# Patient Record
Sex: FEMALE | Race: WHITE | Employment: FULL TIME | URBAN - NONMETROPOLITAN AREA
[De-identification: names, ages, dates, MRNs, and addresses within clinical notes are randomized per-mention and may not be internally consistent; named-entity substitution may affect disease eponyms.]

---

## 2017-02-02 ENCOUNTER — OFFICE VISIT (OUTPATIENT)
Dept: CARDIOLOGY | Age: 47
End: 2017-02-02

## 2017-02-02 VITALS
DIASTOLIC BLOOD PRESSURE: 76 MMHG | HEART RATE: 80 BPM | WEIGHT: 230 LBS | BODY MASS INDEX: 36.1 KG/M2 | SYSTOLIC BLOOD PRESSURE: 142 MMHG | HEIGHT: 67 IN

## 2017-02-02 DIAGNOSIS — R07.9 CHEST PAIN, UNSPECIFIED: Primary | ICD-10-CM

## 2017-02-02 PROCEDURE — 99213 OFFICE O/P EST LOW 20 MIN: CPT | Performed by: INTERNAL MEDICINE

## 2017-02-02 RX ORDER — OXYCODONE AND ACETAMINOPHEN 7.5; 325 MG/1; MG/1
1 TABLET ORAL 2 TIMES DAILY
COMMUNITY
End: 2017-08-03 | Stop reason: ALTCHOICE

## 2017-02-07 DIAGNOSIS — R07.9 CHEST PAIN, UNSPECIFIED: ICD-10-CM

## 2017-08-03 ENCOUNTER — OFFICE VISIT (OUTPATIENT)
Dept: CARDIOLOGY CLINIC | Age: 47
End: 2017-08-03
Payer: COMMERCIAL

## 2017-08-03 VITALS
SYSTOLIC BLOOD PRESSURE: 130 MMHG | BODY MASS INDEX: 35.47 KG/M2 | HEIGHT: 67 IN | HEART RATE: 80 BPM | WEIGHT: 226 LBS | DIASTOLIC BLOOD PRESSURE: 90 MMHG

## 2017-08-03 DIAGNOSIS — D68.2 FACTOR V DEFICIENCY (HCC): Primary | ICD-10-CM

## 2017-08-03 PROCEDURE — 99213 OFFICE O/P EST LOW 20 MIN: CPT | Performed by: INTERNAL MEDICINE

## 2017-08-03 RX ORDER — HYDROCHLOROTHIAZIDE 12.5 MG/1
12.5 CAPSULE, GELATIN COATED ORAL DAILY
Qty: 30 CAPSULE | Refills: 3 | Status: SHIPPED | OUTPATIENT
Start: 2017-08-03 | End: 2018-02-08 | Stop reason: ALTCHOICE

## 2017-08-03 RX ORDER — NORTRIPTYLINE HYDROCHLORIDE 10 MG/1
10 CAPSULE ORAL NIGHTLY
COMMUNITY
End: 2018-02-08 | Stop reason: ALTCHOICE

## 2017-10-24 RX ORDER — WARFARIN SODIUM 5 MG/1
TABLET ORAL
Qty: 135 TABLET | Refills: 3 | Status: SHIPPED | OUTPATIENT
Start: 2017-10-24 | End: 2018-06-21

## 2018-01-16 RX ORDER — WARFARIN SODIUM 7.5 MG/1
TABLET ORAL
Qty: 90 TABLET | Refills: 3 | Status: SHIPPED | OUTPATIENT
Start: 2018-01-16 | End: 2018-06-21

## 2018-01-29 ENCOUNTER — TELEPHONE (OUTPATIENT)
Dept: CARDIOLOGY CLINIC | Age: 48
End: 2018-01-29

## 2018-02-08 ENCOUNTER — OFFICE VISIT (OUTPATIENT)
Dept: CARDIOLOGY CLINIC | Age: 48
End: 2018-02-08
Payer: COMMERCIAL

## 2018-02-08 VITALS
HEIGHT: 67 IN | BODY MASS INDEX: 33.9 KG/M2 | HEART RATE: 80 BPM | SYSTOLIC BLOOD PRESSURE: 122 MMHG | DIASTOLIC BLOOD PRESSURE: 88 MMHG | WEIGHT: 216 LBS

## 2018-02-08 DIAGNOSIS — I26.99 OTHER PULMONARY EMBOLISM WITHOUT ACUTE COR PULMONALE, UNSPECIFIED CHRONICITY (HCC): ICD-10-CM

## 2018-02-08 DIAGNOSIS — I10 ESSENTIAL HYPERTENSION: ICD-10-CM

## 2018-02-08 DIAGNOSIS — I82.5Z3 CHRONIC DEEP VEIN THROMBOSIS (DVT) OF DISTAL VEIN OF BOTH LOWER EXTREMITIES (HCC): ICD-10-CM

## 2018-02-08 DIAGNOSIS — D68.51 FACTOR V LEIDEN (HCC): ICD-10-CM

## 2018-02-08 DIAGNOSIS — G43.C0 PERIODIC HEADACHE SYNDROME, NOT INTRACTABLE: Primary | ICD-10-CM

## 2018-02-08 DIAGNOSIS — R60.0 BILATERAL LEG EDEMA: ICD-10-CM

## 2018-02-08 DIAGNOSIS — I34.1 MVP (MITRAL VALVE PROLAPSE): ICD-10-CM

## 2018-02-08 DIAGNOSIS — G40.A09 NONINTRACTABLE ABSENCE EPILEPSY WITHOUT STATUS EPILEPTICUS (HCC): ICD-10-CM

## 2018-02-08 PROCEDURE — 99213 OFFICE O/P EST LOW 20 MIN: CPT | Performed by: INTERNAL MEDICINE

## 2018-02-08 RX ORDER — METOPROLOL SUCCINATE 200 MG/1
200 TABLET, EXTENDED RELEASE ORAL 2 TIMES DAILY
COMMUNITY
End: 2018-02-08 | Stop reason: SDUPTHER

## 2018-02-08 RX ORDER — METOPROLOL SUCCINATE 200 MG/1
400 TABLET, EXTENDED RELEASE ORAL DAILY
Qty: 60 TABLET | Refills: 6 | Status: SHIPPED | OUTPATIENT
Start: 2018-02-08

## 2018-02-23 ENCOUNTER — TELEPHONE (OUTPATIENT)
Dept: CARDIOLOGY CLINIC | Age: 48
End: 2018-02-23

## 2018-02-23 NOTE — TELEPHONE ENCOUNTER
I notified pt of emma leg venous ultrasound normal results. She states the edema continues in her feet. She also reports when she took diuretics previously, \"it did nothing. \"  I encouraged her to use compression stockings, which she states she could not get on. I told her she could use a lower amount of pressure/ mild compression and that the stockings are available without a prescription. Also, she should elevate her feet when she is able to rest.  She states understanding and agreement. She will keep us updated as needed.   Her follow up is scheduled for August.

## 2018-06-21 ENCOUNTER — OFFICE VISIT (OUTPATIENT)
Dept: CARDIOLOGY CLINIC | Age: 48
End: 2018-06-21
Payer: COMMERCIAL

## 2018-06-21 VITALS
HEIGHT: 67 IN | BODY MASS INDEX: 34.84 KG/M2 | DIASTOLIC BLOOD PRESSURE: 88 MMHG | HEART RATE: 80 BPM | WEIGHT: 222 LBS | SYSTOLIC BLOOD PRESSURE: 138 MMHG

## 2018-06-21 DIAGNOSIS — G40.A09 NONINTRACTABLE ABSENCE EPILEPSY WITHOUT STATUS EPILEPTICUS (HCC): ICD-10-CM

## 2018-06-21 DIAGNOSIS — I82.5Z3 CHRONIC DEEP VEIN THROMBOSIS (DVT) OF DISTAL VEIN OF BOTH LOWER EXTREMITIES (HCC): ICD-10-CM

## 2018-06-21 DIAGNOSIS — I10 ESSENTIAL HYPERTENSION: ICD-10-CM

## 2018-06-21 DIAGNOSIS — D68.51 FACTOR V LEIDEN (HCC): ICD-10-CM

## 2018-06-21 DIAGNOSIS — I26.99 OTHER PULMONARY EMBOLISM WITHOUT ACUTE COR PULMONALE, UNSPECIFIED CHRONICITY (HCC): ICD-10-CM

## 2018-06-21 DIAGNOSIS — I34.1 MVP (MITRAL VALVE PROLAPSE): Primary | ICD-10-CM

## 2018-06-21 DIAGNOSIS — G43.C0 PERIODIC HEADACHE SYNDROME, NOT INTRACTABLE: ICD-10-CM

## 2018-06-21 PROCEDURE — 99213 OFFICE O/P EST LOW 20 MIN: CPT | Performed by: INTERNAL MEDICINE

## 2018-06-21 RX ORDER — NITROGLYCERIN 0.3 MG/1
0.3 TABLET SUBLINGUAL EVERY 5 MIN PRN
Qty: 25 TABLET | Refills: 1 | Status: SHIPPED | OUTPATIENT
Start: 2018-06-21

## 2018-06-22 ENCOUNTER — TELEPHONE (OUTPATIENT)
Dept: CARDIOLOGY | Age: 48
End: 2018-06-22

## 2018-08-09 ENCOUNTER — OFFICE VISIT (OUTPATIENT)
Dept: CARDIOLOGY CLINIC | Age: 48
End: 2018-08-09
Payer: COMMERCIAL

## 2018-08-09 VITALS
HEIGHT: 67 IN | WEIGHT: 223 LBS | DIASTOLIC BLOOD PRESSURE: 88 MMHG | HEART RATE: 64 BPM | BODY MASS INDEX: 35 KG/M2 | SYSTOLIC BLOOD PRESSURE: 130 MMHG

## 2018-08-09 DIAGNOSIS — I34.1 MVP (MITRAL VALVE PROLAPSE): Primary | ICD-10-CM

## 2018-08-09 DIAGNOSIS — I26.99 OTHER PULMONARY EMBOLISM WITHOUT ACUTE COR PULMONALE, UNSPECIFIED CHRONICITY (HCC): ICD-10-CM

## 2018-08-09 DIAGNOSIS — I82.5Z3 CHRONIC DEEP VEIN THROMBOSIS (DVT) OF DISTAL VEIN OF BOTH LOWER EXTREMITIES (HCC): ICD-10-CM

## 2018-08-09 DIAGNOSIS — G43.C0 PERIODIC HEADACHE SYNDROME, NOT INTRACTABLE: ICD-10-CM

## 2018-08-09 DIAGNOSIS — D68.51 FACTOR V LEIDEN (HCC): ICD-10-CM

## 2018-08-09 DIAGNOSIS — I10 ESSENTIAL HYPERTENSION: ICD-10-CM

## 2018-08-09 DIAGNOSIS — G40.A09 NONINTRACTABLE ABSENCE EPILEPSY WITHOUT STATUS EPILEPTICUS (HCC): ICD-10-CM

## 2018-08-09 PROCEDURE — 99213 OFFICE O/P EST LOW 20 MIN: CPT | Performed by: INTERNAL MEDICINE

## 2018-08-09 RX ORDER — TRIAMTERENE AND HYDROCHLOROTHIAZIDE 37.5; 25 MG/1; MG/1
1 TABLET ORAL DAILY
COMMUNITY

## 2018-08-09 NOTE — PROGRESS NOTES
rhythm, S1, S2 normal, no murmur, click, rub or gallop  Abdomen: soft, non-tender; bowel sounds normal; no masses,  no organomegaly  Extremities: extremities normal, atraumatic, no cyanosis or edema  Neurologic: Mental status: Alert, oriented, thought content appropriate      EKG: Normal Sinus Rhythm with no ischemic changes. Last Cath: 3/17/2016   Mild nonobstructive CAD. Normal global left ventricular systolic function. Past Medical and Surgical History, Problem List, Allergies, Medications, Labs, Imaging, all reviewed extensively in EMR and with the patient. Assessment and Plan:    1. Arm and neck pain resolved with nitro- has recent clean cath. Likely vasospasm. 2. Hemarthrosis into hip joint back when INR was 4.4. Has not been able to control INR well. We changed to Eliquis. Never had bleeding joint with Eliquis. Easier to control dose and levels. She can't be off meds, because has had multiple clots. 3. BL leg edema and leg pain. Negative venous doppler for DVT. 4. Heterozygous Factor V Leiden on lifelong anticoagulation. Stable, chronic. Has had multiple thrombi. 5. Had hip replaced, was on bridging lovenox while off coumadin for surgery  6. Hx of multiple prior DVTs and PEs  7. MVP- Not evident on 2016 echo. Stable, asymptomatic. 8. Seizures- stable, chronic  9. Echo 8/11/2016- Normal EF 60-65%, mild MR  10. Cardiac cath 2016- clean cath  11. Counseled to be compliant with all physician visits, physician recommendations, and medication compliance. 12. Discussed medications and side effects extensively. 13. Answered all questions. 14. Educational materials provided. 15. Follow up appointments set. Thank you for allowing me to participate in the care of this patient, please do not hesitate to call if you have any questions. Claude Barnes, [id number] [facility]  [facility] [employer]  [phone]